# Patient Record
Sex: FEMALE | Race: WHITE | URBAN - METROPOLITAN AREA
[De-identification: names, ages, dates, MRNs, and addresses within clinical notes are randomized per-mention and may not be internally consistent; named-entity substitution may affect disease eponyms.]

---

## 2017-09-11 ENCOUNTER — PRENATAL OFFICE VISIT - HEALTHEAST (OUTPATIENT)
Dept: MIDWIFE SERVICES | Facility: CLINIC | Age: 37
End: 2017-09-11

## 2017-09-11 DIAGNOSIS — O34.599 UTERINE ABNORMALITY DURING PREGNANCY, ANTEPARTUM: ICD-10-CM

## 2017-09-11 DIAGNOSIS — O09.521 SUPERVISION OF HIGH-RISK PREGNANCY OF ELDERLY MULTIGRAVIDA, FIRST TRIMESTER: ICD-10-CM

## 2017-09-11 LAB — HIV 1+2 AB+HIV1 P24 AG SERPL QL IA: NEGATIVE

## 2017-09-11 RX ORDER — LORATADINE 10 MG/1
10 TABLET ORAL DAILY
Status: SHIPPED | COMMUNITY
Start: 2017-09-11

## 2017-09-11 ASSESSMENT — MIFFLIN-ST. JEOR: SCORE: 1347.98

## 2017-09-12 ENCOUNTER — HOSPITAL ENCOUNTER (OUTPATIENT)
Dept: ULTRASOUND IMAGING | Facility: CLINIC | Age: 37
Discharge: HOME | End: 2017-09-12
Attending: MIDWIFE

## 2017-09-12 ENCOUNTER — COMMUNICATION - HEALTHEAST (OUTPATIENT)
Dept: TELEHEALTH | Facility: CLINIC | Age: 37
End: 2017-09-12

## 2017-09-12 ENCOUNTER — COMMUNICATION - HEALTHEAST (OUTPATIENT)
Dept: MIDWIFE SERVICES | Facility: CLINIC | Age: 37
End: 2017-09-12

## 2017-09-12 ENCOUNTER — AMBULATORY - HEALTHEAST (OUTPATIENT)
Dept: MIDWIFE SERVICES | Facility: CLINIC | Age: 37
End: 2017-09-12

## 2017-09-12 DIAGNOSIS — O09.521 SUPERVISION OF HIGH-RISK PREGNANCY OF ELDERLY MULTIGRAVIDA, FIRST TRIMESTER: ICD-10-CM

## 2017-09-12 DIAGNOSIS — O09.522 SUPERVISION OF HIGH-RISK PREGNANCY OF ELDERLY MULTIGRAVIDA, SECOND TRIMESTER: ICD-10-CM

## 2017-09-12 DIAGNOSIS — O34.599 UTERINE ABNORMALITY DURING PREGNANCY, ANTEPARTUM: ICD-10-CM

## 2017-09-12 LAB
HBV SURFACE AG SERPL QL IA: NEGATIVE
SYPHILIS RPR SCREEN - HISTORICAL: NORMAL

## 2017-09-13 ENCOUNTER — COMMUNICATION - HEALTHEAST (OUTPATIENT)
Dept: MIDWIFE SERVICES | Facility: CLINIC | Age: 37
End: 2017-09-13

## 2017-09-14 ENCOUNTER — COMMUNICATION - HEALTHEAST (OUTPATIENT)
Dept: ADMINISTRATIVE | Facility: CLINIC | Age: 37
End: 2017-09-14

## 2017-09-14 LAB
HPV INTERPRETATION - HISTORICAL: NORMAL
HPV INTERPRETER - HISTORICAL: NORMAL

## 2017-09-20 ENCOUNTER — AMBULATORY - HEALTHEAST (OUTPATIENT)
Dept: MIDWIFE SERVICES | Facility: CLINIC | Age: 37
End: 2017-09-20

## 2017-09-20 DIAGNOSIS — O09.529 SUPERVISION OF HIGH-RISK PREGNANCY OF ELDERLY MULTIGRAVIDA: ICD-10-CM

## 2017-09-20 LAB

## 2017-10-05 ENCOUNTER — RECORDS - HEALTHEAST (OUTPATIENT)
Dept: ADMINISTRATIVE | Facility: OTHER | Age: 37
End: 2017-10-05

## 2017-12-13 ENCOUNTER — RECORDS - HEALTHEAST (OUTPATIENT)
Dept: LAB | Facility: CLINIC | Age: 37
End: 2017-12-13

## 2017-12-13 LAB
ALT SERPL W P-5'-P-CCNC: 14 U/L (ref 0–45)
AST SERPL W P-5'-P-CCNC: 17 U/L (ref 0–40)
CREAT SERPL-MCNC: 0.51 MG/DL (ref 0.6–1.1)
GFR SERPL CREATININE-BSD FRML MDRD: >60 ML/MIN/1.73M2

## 2018-01-02 ENCOUNTER — ANESTHESIA - HEALTHEAST (OUTPATIENT)
Dept: SURGERY | Facility: CLINIC | Age: 38
End: 2018-01-02

## 2018-01-03 ENCOUNTER — SURGERY - HEALTHEAST (OUTPATIENT)
Dept: SURGERY | Facility: CLINIC | Age: 38
End: 2018-01-03

## 2018-01-03 ENCOUNTER — ANESTHESIA - HEALTHEAST (OUTPATIENT)
Dept: SURGERY | Facility: CLINIC | Age: 38
End: 2018-01-03

## 2018-01-03 ASSESSMENT — MIFFLIN-ST. JEOR
SCORE: 1444.36
SCORE: 1444.36

## 2018-01-06 ENCOUNTER — HOME CARE/HOSPICE - HEALTHEAST (OUTPATIENT)
Dept: HOME HEALTH SERVICES | Facility: HOME HEALTH | Age: 38
End: 2018-01-06

## 2018-01-09 ENCOUNTER — COMMUNICATION - HEALTHEAST (OUTPATIENT)
Dept: OBGYN | Facility: CLINIC | Age: 38
End: 2018-01-09

## 2018-03-08 ENCOUNTER — ANESTHESIA - HEALTHEAST (OUTPATIENT)
Dept: SURGERY | Facility: CLINIC | Age: 38
End: 2018-03-08

## 2018-03-08 ENCOUNTER — SURGERY - HEALTHEAST (OUTPATIENT)
Dept: SURGERY | Facility: CLINIC | Age: 38
End: 2018-03-08

## 2018-03-08 ASSESSMENT — MIFFLIN-ST. JEOR: SCORE: 1326.32

## 2018-03-09 ASSESSMENT — MIFFLIN-ST. JEOR: SCORE: 1335.39

## 2018-03-20 ENCOUNTER — OFFICE VISIT - HEALTHEAST (OUTPATIENT)
Dept: FAMILY MEDICINE | Facility: CLINIC | Age: 38
End: 2018-03-20

## 2018-03-20 DIAGNOSIS — E87.6 LOW BLOOD POTASSIUM: ICD-10-CM

## 2018-03-20 DIAGNOSIS — N13.2 URETERAL STONE WITH HYDRONEPHROSIS: ICD-10-CM

## 2018-03-20 DIAGNOSIS — Z01.810 PREOP CARDIOVASCULAR EXAM: ICD-10-CM

## 2018-03-20 LAB
ANION GAP SERPL CALCULATED.3IONS-SCNC: 12 MMOL/L (ref 5–18)
BUN SERPL-MCNC: 19 MG/DL (ref 8–22)
CALCIUM SERPL-MCNC: 9.4 MG/DL (ref 8.5–10.5)
CHLORIDE BLD-SCNC: 105 MMOL/L (ref 98–107)
CO2 SERPL-SCNC: 23 MMOL/L (ref 22–31)
CREAT SERPL-MCNC: 0.68 MG/DL (ref 0.6–1.1)
ERYTHROCYTE [DISTWIDTH] IN BLOOD BY AUTOMATED COUNT: 12.8 % (ref 11–14.5)
GFR SERPL CREATININE-BSD FRML MDRD: >60 ML/MIN/1.73M2
GLUCOSE BLD-MCNC: 97 MG/DL (ref 70–125)
HCT VFR BLD AUTO: 35.5 % (ref 35–47)
HGB BLD-MCNC: 11.9 G/DL (ref 12–16)
MCH RBC QN AUTO: 29.7 PG (ref 27–34)
MCHC RBC AUTO-ENTMCNC: 33.4 G/DL (ref 32–36)
MCV RBC AUTO: 89 FL (ref 80–100)
PLATELET # BLD AUTO: 555 THOU/UL (ref 140–440)
PMV BLD AUTO: 7.2 FL (ref 7–10)
POTASSIUM BLD-SCNC: 4.2 MMOL/L (ref 3.5–5)
RBC # BLD AUTO: 3.99 MILL/UL (ref 3.8–5.4)
SODIUM SERPL-SCNC: 140 MMOL/L (ref 136–145)
WBC: 6.9 THOU/UL (ref 4–11)

## 2018-03-20 ASSESSMENT — MIFFLIN-ST. JEOR: SCORE: 1350.81

## 2018-03-22 ENCOUNTER — ANESTHESIA - HEALTHEAST (OUTPATIENT)
Dept: SURGERY | Facility: CLINIC | Age: 38
End: 2018-03-22

## 2018-03-22 ENCOUNTER — COMMUNICATION - HEALTHEAST (OUTPATIENT)
Dept: FAMILY MEDICINE | Facility: CLINIC | Age: 38
End: 2018-03-22

## 2018-03-23 ENCOUNTER — SURGERY - HEALTHEAST (OUTPATIENT)
Dept: SURGERY | Facility: CLINIC | Age: 38
End: 2018-03-23

## 2018-03-23 ENCOUNTER — COMMUNICATION - HEALTHEAST (OUTPATIENT)
Dept: UROLOGY | Facility: CLINIC | Age: 38
End: 2018-03-23

## 2018-03-23 DIAGNOSIS — N13.2 URETERAL STONE WITH HYDRONEPHROSIS: ICD-10-CM

## 2018-03-23 RX ORDER — TOLTERODINE TARTRATE 2 MG/1
2 TABLET, EXTENDED RELEASE ORAL 2 TIMES DAILY
Qty: 28 TABLET | Refills: 0 | Status: SHIPPED | OUTPATIENT
Start: 2018-03-23 | End: 2018-04-06

## 2018-03-23 ASSESSMENT — MIFFLIN-ST. JEOR: SCORE: 1335.84

## 2018-03-27 ENCOUNTER — COMMUNICATION - HEALTHEAST (OUTPATIENT)
Dept: UROLOGY | Facility: CLINIC | Age: 38
End: 2018-03-27

## 2018-03-29 ENCOUNTER — AMBULATORY - HEALTHEAST (OUTPATIENT)
Dept: UROLOGY | Facility: CLINIC | Age: 38
End: 2018-03-29

## 2018-03-29 DIAGNOSIS — N20.1 CALCULUS OF URETER: ICD-10-CM

## 2018-03-29 DIAGNOSIS — N20.0 CALCULUS OF KIDNEY: ICD-10-CM

## 2018-03-29 DIAGNOSIS — N20.9 URINARY TRACT STONES: ICD-10-CM

## 2018-03-29 LAB
ALBUMIN UR-MCNC: NEGATIVE MG/DL
APPEARANCE UR: CLEAR
BILIRUB UR QL STRIP: NEGATIVE
COLOR UR AUTO: YELLOW
GLUCOSE UR STRIP-MCNC: NEGATIVE MG/DL
HGB UR QL STRIP: ABNORMAL
KETONES UR STRIP-MCNC: NEGATIVE MG/DL
LEUKOCYTE ESTERASE UR QL STRIP: ABNORMAL
NITRATE UR QL: NEGATIVE
PH UR STRIP: 5.5 [PH] (ref 5–8)
SP GR UR STRIP: 1.01 (ref 1–1.03)
UROBILINOGEN UR STRIP-ACNC: ABNORMAL

## 2018-03-30 LAB — BACTERIA SPEC CULT: NO GROWTH

## 2018-05-07 ENCOUNTER — AMBULATORY - HEALTHEAST (OUTPATIENT)
Dept: LAB | Facility: CLINIC | Age: 38
End: 2018-05-07

## 2018-05-07 ENCOUNTER — OFFICE VISIT - HEALTHEAST (OUTPATIENT)
Dept: UROLOGY | Facility: CLINIC | Age: 38
End: 2018-05-07

## 2018-05-07 DIAGNOSIS — N20.0 CALCULUS OF KIDNEY: ICD-10-CM

## 2018-05-07 DIAGNOSIS — N20.9 URINARY TRACT STONES: ICD-10-CM

## 2018-05-07 DIAGNOSIS — N20.9 URINARY CALCULUS: ICD-10-CM

## 2018-05-07 LAB
ALBUMIN SERPL-MCNC: 3.9 G/DL (ref 3.5–5)
ALBUMIN UR-MCNC: NEGATIVE MG/DL
ANION GAP SERPL CALCULATED.3IONS-SCNC: 11 MMOL/L (ref 5–18)
APPEARANCE UR: CLEAR
BILIRUB UR QL STRIP: NEGATIVE
BUN SERPL-MCNC: 10 MG/DL (ref 8–22)
CALCIUM SERPL-MCNC: 9.1 MG/DL (ref 8.5–10.5)
CALCIUM SERPL-MCNC: 9.1 MG/DL (ref 8.5–10.5)
CALCIUM, IONIZED MEASURED: 1.17 MMOL/L (ref 1.11–1.3)
CHLORIDE BLD-SCNC: 106 MMOL/L (ref 98–107)
CO2 SERPL-SCNC: 23 MMOL/L (ref 22–31)
COLOR UR AUTO: YELLOW
CREAT SERPL-MCNC: 0.66 MG/DL (ref 0.6–1.1)
CREAT SERPL-MCNC: 0.68 MG/DL (ref 0.6–1.1)
GFR SERPL CREATININE-BSD FRML MDRD: >60 ML/MIN/1.73M2
GFR SERPL CREATININE-BSD FRML MDRD: >60 ML/MIN/1.73M2
GLUCOSE BLD-MCNC: 96 MG/DL (ref 70–125)
GLUCOSE UR STRIP-MCNC: NEGATIVE MG/DL
HGB UR QL STRIP: NEGATIVE
ION CA PH 7.4: 1.13 MMOL/L (ref 1.11–1.3)
KETONES UR STRIP-MCNC: NEGATIVE MG/DL
LEUKOCYTE ESTERASE UR QL STRIP: ABNORMAL
MAGNESIUM SERPL-MCNC: 2 MG/DL (ref 1.8–2.6)
NITRATE UR QL: NEGATIVE
PH UR STRIP: 6 [PH] (ref 5–8)
PH: 7.32 (ref 7.35–7.45)
PHOSPHATE SERPL-MCNC: 2.9 MG/DL (ref 2.5–4.5)
PHOSPHATE SERPL-MCNC: 2.9 MG/DL (ref 2.5–4.5)
POTASSIUM BLD-SCNC: 4.2 MMOL/L (ref 3.5–5)
PTH-INTACT SERPL-MCNC: 60 PG/ML (ref 10–86)
SODIUM SERPL-SCNC: 140 MMOL/L (ref 136–145)
SP GR UR STRIP: 1.02 (ref 1–1.03)
URATE SERPL-MCNC: 3.1 MG/DL (ref 2–7.5)
UROBILINOGEN UR STRIP-ACNC: ABNORMAL

## 2020-08-18 ENCOUNTER — COMMUNICATION - HEALTHEAST (OUTPATIENT)
Dept: UROLOGY | Facility: CLINIC | Age: 40
End: 2020-08-18

## 2020-08-21 ENCOUNTER — COMMUNICATION - HEALTHEAST (OUTPATIENT)
Dept: UROLOGY | Facility: CLINIC | Age: 40
End: 2020-08-21

## 2020-08-28 ENCOUNTER — COMMUNICATION - HEALTHEAST (OUTPATIENT)
Dept: UROLOGY | Facility: CLINIC | Age: 40
End: 2020-08-28

## 2020-09-04 ENCOUNTER — COMMUNICATION - HEALTHEAST (OUTPATIENT)
Dept: UROLOGY | Facility: CLINIC | Age: 40
End: 2020-09-04

## 2020-09-11 ENCOUNTER — COMMUNICATION - HEALTHEAST (OUTPATIENT)
Dept: UROLOGY | Facility: CLINIC | Age: 40
End: 2020-09-11

## 2021-05-31 VITALS
HEIGHT: 62 IN | WEIGHT: 183 LBS | WEIGHT: 183 LBS | BODY MASS INDEX: 33.68 KG/M2 | HEIGHT: 62 IN | BODY MASS INDEX: 33.68 KG/M2

## 2021-05-31 VITALS — WEIGHT: 160 LBS | HEIGHT: 62 IN | BODY MASS INDEX: 29.44 KG/M2

## 2021-06-01 VITALS — BODY MASS INDEX: 29.72 KG/M2 | WEIGHT: 161.5 LBS | HEIGHT: 62 IN

## 2021-06-01 VITALS — HEIGHT: 60 IN | WEIGHT: 164.4 LBS | BODY MASS INDEX: 32.28 KG/M2

## 2021-06-01 VITALS — HEIGHT: 62 IN | WEIGHT: 158.2 LBS | BODY MASS INDEX: 29.11 KG/M2

## 2021-06-10 NOTE — TELEPHONE ENCOUNTER
Message left for patient to call clinic to set up an appointment for kidney stone follow-up for today or tomorrow.  Fiona Rider RN

## 2021-06-10 NOTE — TELEPHONE ENCOUNTER
Message left for patient to call clinic to set up an appointment for kidney stone follow-up.  Fiona Rider RN

## 2021-06-11 NOTE — TELEPHONE ENCOUNTER
Spoke with patient who states that she does not have insurance at this time and is feeling well.  She will call [facility] if needed and understands her diagnosis and risks.  Fiona Rider RN

## 2021-06-12 NOTE — PROGRESS NOTES
Ultrasound report reviewed with patient over the phone.  Patient very surprised as she thought she was only 11 weeks gestation.  Agreeable to change EDC to reflect current dating of 21 weeks and 1 day.  Reviewed normal fetal survey.  No questions at this time.  Encouraged to follow-up for routine prenatal care in 4 weeks.

## 2021-06-12 NOTE — PROGRESS NOTES
PRENATAL VISIT   FIRST OBSTETRICAL EXAM - OB    Assessment / Impression     First prenatal visit at 11w0d    High risk pregnancy of elderly multiparous woman with uterine septum, 3 repeat C/S and 6 D&C after SABs (6) in first trimester    Plan:     -Transfer care to obstetrician if viable pregnancy  -IOB labs drawn  -Danger s/sx for this trimester reviewed with patient.  -The following referrals were given to patient for follow up: first trimester ultrasound to confirm viability, referral to OB/GYN physician for care.  -IOB packet given and reviewed with patient.      Total time spent with patient: 45 minutes, >50% time spent counseling and coordinating care.    Subjective:    Sweta Melchor is a 37 y.o.  here today for her First Obstetrical Exam.  Unplanned pregnancy, was using condoms for birth control but not consistently. Here today with Diego. After discussion pt agrees to transfer care to obstetrician for care if viable pregnancy. Given numbers for OB groups in the [facility].     Education level: some college  Occupation: homemaker  OB History    Para Term  AB Living   4 3 3   3   SAB TAB Ectopic Multiple Live Births       3      # Outcome Date GA Lbr Smith/2nd Weight Sex Delivery Anes PTL Lv   4 Current            3 Term 12 38w0d  8 lb 7 oz (3.827 kg) M CS-LTranv   SUJATHA   2 Term 05 38w0d  7 lb 8 oz (3.402 kg) M CS-LTranv   SUJATHA   1 Term 10/15/97 38w0d  6 lb 4 oz (2.835 kg) F CS-LTranv   SUJATHA          Expected Date of Delivery: 2018, Date entered prior to episode creation    No past medical history on file.  No past surgical history on file.  Social History   Substance Use Topics     Smoking status: Never Smoker     Smokeless tobacco: Never Used     Alcohol use No     Current Outpatient Prescriptions   Medication Sig Dispense Refill     DIPHENHYDRAMINE HCL (BENADRYL ALLERGY ORAL) Take by mouth.       loratadine (CLARITIN) 10 mg tablet Take 10 mg by mouth daily.    "    PRENATAL VIT CALC,IRON,FOLIC (PRENATAL VITAMIN ORAL) Take by mouth.       No current facility-administered medications for this visit.      Allergies   Allergen Reactions     Codeine      Other Environmental Allergy      Penicillins              Pregnancy Risk Factors: Age is <18 or >35, 1st trimester pregnancy loss, 3 or more, Significantly overweight or underweight and Uterine abnormality    EPDS score today: 3    Review of Systems  General:  Denies problem  Eyes: Denies problem  Ears/Nose/Throat: Denies problem  Cardiovascular: Denies problem  Respiratory:  Denies problem  Gastrointestinal: some nausea  Genitourinary: Denies problem  Musculoskeletal: fatigue  Skin: Denies problem  Neurologic: Denies problem  Psychiatric: Denies problem  Endocrine: Denies problem  Heme/Lymphatic: breast tenderness  Allergic/Immunologic: Denies problem       Objective:   Objective    Vitals:    09/11/17 1049   BP: 124/72   Pulse: 84   Weight: 160 lb (72.6 kg)   Height: 5' 2.25\" (1.581 m)     Physical Exam:  General Appearance: Alert, cooperative, no distress, appears stated age  Head: Normocephalic, without obvious abnormality, atraumatic  Eyes: Conjunctiva/corneas clear, does not wear corrective lenses  Neck: Supple, symmetrical, trachea midline, no adenopathy  Thyroid: not enlarged, symmetric, no tenderness/mass/nodules  Back: Symmetric, no curvature, ROM normal, no CVA tenderness  Lungs: Clear to auscultation bilaterally, respirations unlabored  Heart: Regular rate and rhythm, S1 and S2 normal, no murmur, rub, or gallop  Breasts: No breast masses, tenderness, asymmetry, or nipple discharge. Nipples are  everted.  Abdomen: Soft, non-tender, bowel sounds active all four quadrants, no masses.   FHT: not heard with Doptone today   Vulva:  no sign of lesions or condyloma, normal hair distribution  Vagina: pink, normal rugae, no abnormal discharge  Cervix:  long/thick/closed, no lesions or inflammation noted, negative CMT with " exam  Uterus: mid position, non tender, enlarged approximately 10 weeks size  Adnexa:  no masses appreciated, non-tender with palpation  Pelvic spines:AVERAGE  Sacrum:CONCAVE  Suprapubic Arch:NORMAL  Pelvis type:gynecoid            Extremities: Extremities normal, atraumatic, no cyanosis or edema  Skin: Skin color, texture, turgor normal, no rashes or lesions  Lymph nodes: Cervical, supraclavicular, and axillary nodes normal  Neurologic: Alert and oriented x 3.    Lab: No results found for this or any previous visit.

## 2021-06-15 NOTE — ANESTHESIA PREPROCEDURE EVALUATION
Anesthesia Evaluation      Patient summary reviewed   No history of anesthetic complications     Airway   Mallampati: I  Neck ROM: full   Pulmonary - negative ROS and normal exam    breath sounds clear to auscultation                         Cardiovascular - negative ROS and normal exam  Exercise tolerance: > or = 4 METS  Rhythm: regular  Rate: normal,         Neuro/Psych - negative ROS     Endo/Other    (+) obesity, pregnant     GI/Hepatic/Renal    (+)   chronic renal disease,      Other findings: Nauseated, significant bleeding after  in need of emergent D+C.      Dental - normal exam                        Anesthesia Plan  Planned anesthetic: general endotracheal    ASA 2 - emergent     Anesthetic plan and risks discussed with: patient  Anesthesia plan special considerations: rapid sequence induction,   Post-op plan: routine recovery

## 2021-06-15 NOTE — ANESTHESIA PREPROCEDURE EVALUATION
Anesthesia Evaluation      Patient summary reviewed   No history of anesthetic complications     Airway   Mallampati: I  Neck ROM: full   Pulmonary - normal exam    breath sounds clear to auscultation  (+) a smoker                         Cardiovascular - negative ROS and normal exam  Exercise tolerance: > or = 4 METS  Rhythm: regular  Rate: normal,         Neuro/Psych - negative ROS     Endo/Other    (+) obesity, pregnant     GI/Hepatic/Renal - negative ROS   (+)   chronic renal disease,      Other findings: Gravid. Denies PIH, GDM or Preeclampsia.          Dental                         Anesthesia Plan  Planned anesthetic: spinal    ASA 2     Anesthetic plan and risks discussed with: patient    Post-op plan: routine recovery

## 2021-06-15 NOTE — ANESTHESIA POSTPROCEDURE EVALUATION
Patient: Sweta Melchor   SECTION, REPEAT, WITH TUBAL LIGATION  Anesthesia type: spinal    Patient location: PACU  Last vitals:   Vitals:    18 1200   BP: 119/69   Pulse: 76   Resp: 11   Temp: 36.4  C (97.5  F)   SpO2: 97%     Post vital signs: stable  Level of consciousness: awake and responds to simple questions  Post-anesthesia pain: pain controlled  Post-anesthesia nausea and vomiting: no  Pulmonary: unassisted, return to baseline  Cardiovascular: stable and blood pressure at baseline  Hydration: adequate  Anesthetic events: no    QCDR Measures:  ASA# 11 - Maggi-op Cardiac Arrest: ASA11B - Patient did NOT experience unanticipated cardiac arrest  ASA# 12 - Maggi-op Mortality Rate: ASA12B - Patient did NOT die  ASA# 13 - PACU Re-Intubation Rate: ASA13B - Patient did NOT require a new airway mgmt  ASA# 10 - Composite Anes Safety: ASA10A - No serious adverse event    Additional Notes:

## 2021-06-15 NOTE — ANESTHESIA CARE TRANSFER NOTE
Last vitals:   Vitals:    01/03/18 0911   BP: 130/63   Pulse: 98   Resp: 16   Temp: 36.8  C (98.2  F)   SpO2: 99%     Patient's level of consciousness is awake  Spontaneous respirations: yes  Maintains airway independently: yes  Dentition unchanged: yes  Oropharynx: oropharynx clear of all foreign objects    QCDR Measures:  ASA# 20 - Surgical Safety Checklist: WHO surgical safety checklist completed prior to induction  PQRS# 430 - Adult PONV Prevention: 4558F - Pt received => 2 anti-emetic agents (different classes) preop & intraop  ASA# 8 - Peds PONV Prevention: NA - Not pediatric patient, not GA or 2 or more risk factors NOT present  PQRS# 424 - Maggi-op Temp Management: 4559F - At least one body temp DOCUMENTED => 35.5C or 95.9F within required timeframe  PQRS# 426 - PACU Transfer Protocol: - Transfer of care checklist used  ASA# 14 - Acute Post-op Pain: ASA14B - Patient did NOT experience pain >= 7 out of 10

## 2021-06-15 NOTE — ANESTHESIA PROCEDURE NOTES
Spinal Block    Patient location during procedure: OR  Start time: 1/3/2018 7:46 AM  End time: 1/3/2018 7:50 AM  Reason for block: primary anesthetic    Staffing:  Performing  Anesthesiologist: BETTY PENA    Preanesthetic Checklist  Completed: patient identified, risks, benefits, and alternatives discussed, timeout performed, consent obtained, airway assessed, oxygen available, suction available, emergency drugs available and hand hygiene performed  Spinal Block  Patient position: sitting  Prep: ChloraPrep and site prepped and draped  Patient monitoring: heart rate, cardiac monitor, continuous pulse ox and blood pressure  Approach: midline  Location: L4-5  Injection technique: single-shot  Needle type: pencil-tip   Needle gauge: 24 G

## 2021-06-15 NOTE — ANESTHESIA POSTPROCEDURE EVALUATION
Patient: Sweta Melchor  DILATION AND CURETTAGE  Anesthesia type: No value filed.    Patient location: PACU  Last vitals:   Vitals:    01/03/18 1150   BP: 118/71   Pulse: 87   Resp: 12   Temp:    SpO2: 96%     Post vital signs: stable  Level of consciousness: awake and responds to simple questions  Post-anesthesia pain: pain controlled  Post-anesthesia nausea and vomiting: no  Pulmonary: unassisted, return to baseline  Cardiovascular: stable and blood pressure at baseline  Hydration: adequate  Anesthetic events: no    QCDR Measures:  ASA# 11 - Maggi-op Cardiac Arrest: ASA11B - Patient did NOT experience unanticipated cardiac arrest  ASA# 12 - Maggi-op Mortality Rate: ASA12B - Patient did NOT die  ASA# 13 - PACU Re-Intubation Rate: ASA13B - Patient did NOT require a new airway mgmt  ASA# 10 - Composite Anes Safety: ASA10A - No serious adverse event    Additional Notes:

## 2021-06-15 NOTE — ANESTHESIA CARE TRANSFER NOTE
Last vitals:   Vitals:    01/03/18 1120   BP: 126/66   Pulse: 89   Resp: 16   Temp: 36.7  C (98  F)   SpO2: 100%     Patient's level of consciousness is awake and drowsy  Spontaneous respirations: yes  Maintains airway independently: yes  Dentition unchanged: yes  Oropharynx: oropharynx clear of all foreign objects    QCDR Measures:  ASA# 20 - Surgical Safety Checklist: WHO surgical safety checklist completed prior to induction  PQRS# 430 - Adult PONV Prevention: 4558F - Pt received => 2 anti-emetic agents (different classes) preop & intraop  ASA# 8 - Peds PONV Prevention: NA - Not pediatric patient, not GA or 2 or more risk factors NOT present  PQRS# 424 - Maggi-op Temp Management: 4559F - At least one body temp DOCUMENTED => 35.5C or 95.9F within required timeframe  PQRS# 426 - PACU Transfer Protocol: - Transfer of care checklist used  ASA# 14 - Acute Post-op Pain: ASA14A - Patient experienced pain >= 7 out of 10

## 2021-06-16 PROBLEM — O09.529 SUPERVISION OF HIGH-RISK PREGNANCY OF ELDERLY MULTIGRAVIDA: Status: ACTIVE | Noted: 2017-09-11

## 2021-06-16 PROBLEM — O34.599 UTERINE ABNORMALITY DURING PREGNANCY, ANTEPARTUM: Status: ACTIVE | Noted: 2017-09-11

## 2021-06-16 PROBLEM — N13.2 HYDRONEPHROSIS WITH URINARY OBSTRUCTION DUE TO URETERAL CALCULUS: Status: ACTIVE | Noted: 2018-03-08

## 2021-06-16 PROBLEM — N13.2 URETERAL STONE WITH HYDRONEPHROSIS: Status: ACTIVE | Noted: 2018-03-08

## 2021-06-16 PROBLEM — N20.1 CALCULUS OF URETER: Status: ACTIVE | Noted: 2018-03-08

## 2021-06-16 PROBLEM — O99.011 ANTEPARTUM ANEMIA IN FIRST TRIMESTER: Status: ACTIVE | Noted: 2017-09-12

## 2021-06-16 PROBLEM — N39.0 UTI (URINARY TRACT INFECTION): Status: ACTIVE | Noted: 2018-03-08

## 2021-06-16 PROBLEM — N12 PYELONEPHRITIS: Status: ACTIVE | Noted: 2018-03-07

## 2021-06-16 PROBLEM — A41.9 SEPSIS (H): Status: ACTIVE | Noted: 2018-03-08

## 2021-06-16 PROBLEM — Z98.891 HISTORY OF CESAREAN DELIVERY: Status: ACTIVE | Noted: 2018-01-03

## 2021-06-16 NOTE — ANESTHESIA CARE TRANSFER NOTE
Last vitals:   Vitals:    03/23/18 0835   BP: 141/81   Pulse: 66   Resp: 16   Temp: 36.6  C (97.9  F)   SpO2: 100%     Patient's level of consciousness is drowsy  Spontaneous respirations: yes  Maintains airway independently: yes  Dentition unchanged: yes  Oropharynx: oropharynx clear of all foreign objects    QCDR Measures:  ASA# 20 - Surgical Safety Checklist: WHO surgical safety checklist completed prior to induction  PQRS# 430 - Adult PONV Prevention: 4558F - Pt received => 2 anti-emetic agents (different classes) preop & intraop  ASA# 8 - Peds PONV Prevention: NA - Not pediatric patient, not GA or 2 or more risk factors NOT present  PQRS# 424 - Maggi-op Temp Management: 4559F - At least one body temp DOCUMENTED => 35.5C or 95.9F within required timeframe  PQRS# 426 - PACU Transfer Protocol: - Transfer of care checklist used  ASA# 14 - Acute Post-op Pain: ASA14B - Patient did NOT experience pain >= 7 out of 10

## 2021-06-16 NOTE — ANESTHESIA POSTPROCEDURE EVALUATION
Patient: Sweta Melchor  CYSTOSCOPY, WITH URETERAL STENT INSERTION  Anesthesia type: MAC    Patient location: PACU  Last vitals:   Vitals:    03/08/18 1251   BP: 102/63   Pulse: (!) 110   Resp: 14   Temp: 36.1  C (96.9  F)   SpO2: 100%     Post vital signs: stable  Level of consciousness: awake and responds to simple questions  Post-anesthesia pain: pain controlled  Post-anesthesia nausea and vomiting: no  Pulmonary: unassisted, face mask  Cardiovascular: stable and blood pressure at baseline  Hydration: adequate  Anesthetic events: no    QCDR Measures:  ASA# 11 - Maggi-op Cardiac Arrest: ASA11B - Patient did NOT experience unanticipated cardiac arrest  ASA# 12 - Maggi-op Mortality Rate: ASA12B - Patient did NOT die  ASA# 13 - PACU Re-Intubation Rate: NA - No ETT / LMA used for case  ASA# 10 - Composite Anes Safety: ASA10A - No serious adverse event    Additional Notes:

## 2021-06-16 NOTE — PROGRESS NOTES
Preoperative Exam    Scheduled Procedure: kidney stone removal   Surgery Date:  03/23/2018  Surgery Location: [facility], fax [phone]    Surgeon:  Dr. Lupillo Santana     Assessment/Plan:     Sweta was seen today for pre-op exam and establish care.    Diagnoses and all orders for this visit:    Preop cardiovascular exam    Ureteral stone with hydronephrosis  -     HM2(CBC w/o Differential)    Low blood potassium  -     Basic Metabolic Panel      Surgical Procedure Risk: Low (reported cardiac risk generally < 1%)  Have you had prior anesthesia?: Yes  Have you or any family members had a previous anesthesia reaction:  No  Do you or any family members have a history of a clotting or bleeding disorder?: No, but pt did hemmorrhage during 2 DNCs and after last child birth  Cardiac Risk Assessment: no increased risk for major cardiac complications    Patient approved for surgery with general or local anesthesia.        Functional Status: Independent  Patient plans to recover at home with family.     Subjective:      Sweta Melchor is a 38 y.o. female who presents for a preoperative consultation. CYSTOSCOPY, RIGHT URETEROSCOPY, LASER LITHOTRIPSY, STENT REMOVAL, STENT INSERTION  All other systems reviewed and are negative, other than those listed in the HPI.    Pertinent History  Do you have difficulty breathing or chest pain after walking up a flight of stairs: No  History of obstructive sleep apnea: No  Steroid use in the last 6 months: No  Frequent Aspirin/NSAID use: No  Prior Blood Transfusion: Yes: after last child birth  Prior Blood Transfusion Reaction: No  If for some reason prior to, during or after the procedure, if it is medically indicated, would you be willing to have a blood transfusion?:  There is no transfusion refusal.    Current Outpatient Prescriptions   Medication Sig Dispense Refill     cholecalciferol, vitamin D3, 1,000 unit tablet Take 1,000 Units by mouth daily.       ciprofloxacin HCl  (CIPRO) 500 MG tablet Take 1 tablet (500 mg total) by mouth 2 times a day at 6:00 am and 4:00 pm for 14 days. 28 tablet 0     DIPHENHYDRAMINE HCL (BENADRYL ALLERGY ORAL) Take 25 mg by mouth every 6 (six) hours as needed.        HYDROmorphone (DILAUDID) 2 MG tablet Take 1-2 tablets (2-4 mg total) by mouth every 4 (four) hours as needed for pain. 30 tablet 0     loratadine (CLARITIN) 10 mg tablet Take 10 mg by mouth daily.       PRENATAL VIT CALC,IRON,FOLIC (PRENATAL VITAMIN ORAL) Take 1 tablet by mouth daily.        tamsulosin (FLOMAX) 0.4 mg Cp24 Take 1 capsule (0.4 mg total) by mouth daily after supper for 14 days. 14 capsule 0     tolterodine (DETROL) 2 MG tablet Take 1 tablet (2 mg total) by mouth 2 (two) times a day for 14 days. 28 tablet 0     No current facility-administered medications for this visit.         Allergies   Allergen Reactions     Codeine Nausea And Vomiting and Swelling     Hydrocodone-Acetaminophen Nausea And Vomiting     Other Environmental Allergy      Oxycodone-Acetaminophen Angiodema and Nausea Only     Penicillins Nausea And Vomiting and Other (See Comments)     Migraine     Sumatriptan Succinate Nausea And Vomiting       Patient Active Problem List   Diagnosis     Supervision of high-risk pregnancy of elderly multigravida     Uterine abnormality during pregnancy, antepartum     Antepartum anemia in first trimester     History of  delivery      delivery delivered     Pyelonephritis     Ureteral stone with hydronephrosis     Sepsis     Calculus of ureter     Hydronephrosis with urinary obstruction due to ureteral calculus     UTI (urinary tract infection)     Septate uterus       Past Medical History:   Diagnosis Date     Allergic      Kidney infection     infrequent now     Uterine abnormality during pregnancy, antepartum     septum in uterus       Past Surgical History:   Procedure Laterality Date     CERVICAL BIOPSY  W/ LOOP ELECTRODE EXCISION        SECTION,  "LOW TRANSVERSE  10/1997, 4/2005, 4/2012     DILATION AND CURETTAGE OF UTERUS  1999,2002,2003, 2013,2014,2015    had hemorrhage after D&C in 2013     DILATION AND CURETTAGE OF UTERUS N/A 1/3/2018    Procedure: DILATION AND CURETTAGE;  Surgeon: Piper Zheng MD;  Location: [facility] OR;  Service:      SD CYSTOSCOPY,INSERT URETERAL STENT Right 3/8/2018    Procedure: CYSTOSCOPY, WITH URETERAL STENT INSERTION;  Surgeon: Lupillo Santana MD;  Location: [facility] OR;  Service: Urology       Social History     Social History     Marital status:      Spouse name: N/A     Number of children: N/A     Years of education: N/A     Occupational History     Not on file.     Social History Main Topics     Smoking status: Current Every Day Smoker     Types: Cigarettes     Last attempt to quit: 2017     Smokeless tobacco: Never Used      Comment: 3-4 cigs per day      Alcohol use No     Drug use: No     Sexual activity: Yes     Partners: Male     Birth control/ protection: None     Other Topics Concern     Not on file     Social History Narrative             Objective:     Vitals:    03/20/18 0957   BP: 116/68   Pulse: 96   Weight: 161 lb 8 oz (73.3 kg)   Height: 5' 2\" (1.575 m)   LMP: 03/20/2018         Physical Exam:  GEN: alert, well appearing  EYES: clear  R EAR: canal clear, TM pearly gray  L EAR: canal clear, TM pearly gray  NOSE: clear  OROPHARYNX: clear  NECK: supple, no significant LAD  CVS: RRR, no murmur  LUNGS: clear, no increased work of breathing  ABD: soft, non-tender, non-distended  EXT: warm, well perfused, no swelling  MSK: nl muscle bulk, spine straight  NEURO: CN grossly intact, nl strength in UE and LE, nl gait,  SKIN: clear    Patient Instructions   Preparing for Surgery  Your white blood cell count improving along with hemoglobin   We still waiting for potassium level    Romi Cartagena MD 3/20/2018 10:13 AM      What you should do:    If you smoke, quit. Smokers may be up to twice as likely " to experience complications after surgery such as pneumonia and respiratory distress.  A week of not smoking before surgery can   help you recover faster    If you have a Health Care Directive, please bring a signed copy to the hospital.    Follow the fasting instructions given to you by your surgeon    Make plans for support when you return home from the hospital (who will help you with household chores and driving while you recover?)    Your medicine plan:     Stop all vitamins, mineral and other supplements 1 week before surgery.    Take all other regular medicines with a little water the morning of your surgery     Do not take any aspirin, AdvilÆ (ibuprofen), or AleveÆ (naproxen) for 7 days before your surgery.  These can increase the risk of bleeding complications during and after your surgery    It is OK to take Tylenol  (acetaminophen) within 7 days of surgery.    Please leave your medicines at home. The hospital will give you any medicines you may need during your hospital stay.    Who should you see if the after hospital plan is not working?    Call your surgeon for any of the following:  o Your incision has any signs of separation  o Signs of infection (increasing redness, swelling, tenderness, warmth, change in appearance, or increased drainage)  o Temperature greater than 101 degrees Fahrenheit  o Drainage from your incision that is green, creamy, sticky, or if it lasts for more than 2 weeks  o Severe pain that is not relieved by medicine, rest or ice    Call your primary care clinic for any of the following:  o If you gain more than three pounds in one day or five pounds in one week  o For trouble breathing more than usual  o Have severe constipation, diarrhea or nausea  o Nausea (upset stomach) and vomiting and/or diarrhea that will not stop  o Blood in your urine or stool    Call 911 if you feel you are having a medical emergency    When should you be seen again?    Your surgeon will let you know when  to be seen for surgical follow-up       If you stay overnight in the hospital, you may also be scheduled to see your primary care provider within 5 days of going home from the hospital to make sure medicines are not causing problems and you understand how to care for yourself.        Romi Cartagena MD 3/20/2018 10:12 AM            Labs:  Recent Results (from the past 24 hour(s))   HM2(CBC w/o Differential)    Collection Time: 03/20/18  9:44 AM   Result Value Ref Range    WBC 6.9 4.0 - 11.0 thou/uL    RBC 3.99 3.80 - 5.40 mill/uL    Hemoglobin 11.9 (L) 12.0 - 16.0 g/dL    Hematocrit 35.5 35.0 - 47.0 %    MCV 89 80 - 100 fL    MCH 29.7 27.0 - 34.0 pg    MCHC 33.4 32.0 - 36.0 g/dL    RDW 12.8 11.0 - 14.5 %    Platelets 555 (H) 140 - 440 thou/uL    MPV 7.2 7.0 - 10.0 fL       Immunization History   Administered Date(s) Administered     Hep B, Adult 09/26/2013     Tdap 03/10/2010, 11/01/2017           Electronically signed by Romi Cartagena MD 03/20/18 9:50 AM

## 2021-06-16 NOTE — ANESTHESIA POSTPROCEDURE EVALUATION
Patient: Sweta Melchor  CYSTOSCOPY, RIGHT URETEROSCOPY, LASER LITHOTRIPSY, STENT REMOVAL, STENT INSERTION  Anesthesia type: general    Patient location: PACU  Last vitals:   Vitals:    03/23/18 0945   BP: 123/70   Pulse: 72   Resp: 18   Temp:    SpO2: 99%     Post vital signs: stable  Level of consciousness: awake and responds to simple questions  Post-anesthesia pain: pain controlled  Post-anesthesia nausea and vomiting: no  Pulmonary: unassisted, return to baseline  Cardiovascular: stable and blood pressure at baseline  Hydration: adequate  Anesthetic events: no    QCDR Measures:  ASA# 11 - Maggi-op Cardiac Arrest: ASA11B - Patient did NOT experience unanticipated cardiac arrest  ASA# 12 - Maggi-op Mortality Rate: ASA12B - Patient did NOT die  ASA# 13 - PACU Re-Intubation Rate: ASA13B - Patient did NOT require a new airway mgmt  ASA# 10 - Composite Anes Safety: ASA10A - No serious adverse event    Additional Notes:

## 2021-06-16 NOTE — ANESTHESIA CARE TRANSFER NOTE
Patient transferred to [facility].  Taken to PACU on O2 at 10L via mask.  In PACU monitors on, vital signs stable.  SBAR report given to RN on floor per institutional policy and procedure.  Transport called.  Transfer of care.    Last vitals:   Vitals:    03/08/18 1251   BP: 102/63   Pulse: (!) 110   Resp: 14   Temp: 36.1  C (96.9  F)   SpO2: 100%     Patient's level of consciousness is drowsy  Spontaneous respirations: yes  Maintains airway independently: yes  Dentition unchanged: yes  Oropharynx: oropharynx clear of all foreign objects    QCDR Measures:  ASA# 20 - Surgical Safety Checklist: WHO surgical safety checklist completed prior to induction  PQRS# 430 - Adult PONV Prevention: 4558F - Pt received => 2 anti-emetic agents (different classes) preop & intraop  ASA# 8 - Peds PONV Prevention: NA - Not pediatric patient, not GA or 2 or more risk factors NOT present  PQRS# 424 - Maggi-op Temp Management: 4559F - At least one body temp DOCUMENTED => 35.5C or 95.9F within required timeframe  PQRS# 426 - PACU Transfer Protocol: - Transfer of care checklist used  ASA# 14 - Acute Post-op Pain: ASA14B - Patient did NOT experience pain >= 7 out of 10

## 2021-06-16 NOTE — ANESTHESIA PREPROCEDURE EVALUATION
Anesthesia Evaluation      Patient summary reviewed     Airway   Mallampati: II   Pulmonary - normal exam                          Cardiovascular   Rhythm: regular  Rate: normal,         Neuro/Psych      Endo/Other       GI/Hepatic/Renal    (+)   chronic renal disease,      Other findings:                  Uterine abnormality during pregnancy, antepartum     Antepartum anemia in first trimester    History of  delivery     delivery delivered    Pyelonephritis    Ureteral stone with hydronephrosis    Sepsis    Calculus of ureter    Hydronephrosis with urinary obstruction due to ureteral calculus    UTI (urinary tract infection)    Septate uterus             Dental - normal exam                        Anesthesia Plan  Planned anesthetic: general LMA  GEN LMA DEC 10 ZOF 4  SMOKER  ASA 2     Anesthetic plan and risks discussed with: patient  Anesthesia plan special considerations: antiemetics,   Post-op plan: routine recovery

## 2021-06-17 NOTE — PROGRESS NOTES
Assessment/Plan:        Diagnoses and all orders for this visit:    Calculus of ureter  -     Cystoscopy with Stent Removal Education  -     Patient Stated Goal: Prevent further stones    Urinary tract stones  -     Urinalysis Macroscopic  -     Culture, Urine    Calculus of kidney      Stone Management Plan  [facility] [place] [facility] 3/29/2018   Urinary Tract Infection No suspicion of infection   Renal Colic Well controlled symptoms   Renal Failure No suspicion of renal failure   R Stone Event Established event   R Post-op status Stent Removal   L Stone Event No current event             Subjective:      HPI  Ms. Sweta Melchor is a 38 y.o.  female returning to the [facility] [place] for early postoperative follow up for anticipated stent removal.     She returns status post right ureteroscopic extraction for renal and ureteral stone. She has had no unanticipated post-operative events.    She has had no symptoms suspicious for infection and stent was mildly symptomatic with typical issues of flank discomfort and lower urinary tract irritation.     Flexible cystoscopy is performed and indwelling stent is removed without incident.    She will follow up in the office in one month without imaging.       ROS   Review of systems is negative except for HPI.    Past Medical History:   Diagnosis Date     History of transfusion      Hypokalemia      Kidney infection     infrequent now     Kidney stone     Right flank pain     Postpartum hemorrhage      Pyelonephritis      Seasonal allergies      Tobacco use      Uterine abnormality during pregnancy, antepartum     septum in uterus       Past Surgical History:   Procedure Laterality Date     CERVICAL BIOPSY  W/ LOOP ELECTRODE EXCISION        SECTION WITH TUBAL LIGATION   2018      SECTION, LOW TRANSVERSE  10/1997, 2005, 2012     DILATION AND CURETTAGE OF UTERUS  ,,, ,,    had hemorrhage after D&C in  2013     DILATION AND CURETTAGE OF UTERUS N/A 1/3/2018    Procedure: DILATION AND CURETTAGE;  Surgeon: Piper Zheng MD;  Location: [facility];  Service:      [place] CYSTO/URETERO W/LITHOTRIPSY &INDWELL STENT INSRT Right 3/23/2018    Procedure: CYSTOSCOPY, RIGHT URETEROSCOPY, LASER LITHOTRIPSY, STENT REMOVAL, STENT INSERTION;  Surgeon: Lupillo Santana MD;  Location: [facility];  Service: Urology     [place] CYSTOSCOPY,INSERT URETERAL STENT Right 3/8/2018    Procedure: CYSTOSCOPY, WITH URETERAL STENT INSERTION;  Surgeon: Lupillo Santana MD;  Location: [facility];  Service: Urology       Current Outpatient Prescriptions   Medication Sig Dispense Refill     cholecalciferol, vitamin D3, 1,000 unit tablet Take 1,000 Units by mouth daily.       ciprofloxacin HCl (CIPRO) 500 MG tablet Take 1 tablet (500 mg total) by mouth 2 (two) times a day for 14 days. 28 tablet 0     DIPHENHYDRAMINE HCL (BENADRYL ALLERGY ORAL) Take 25 mg by mouth every 6 (six) hours as needed.        HYDROmorphone (DILAUDID) 2 MG tablet Take 1-2 tablets (2-4 mg total) by mouth every 4 (four) hours as needed for pain. 30 tablet 0     loratadine (CLARITIN) 10 mg tablet Take 10 mg by mouth daily.       PRENATAL VIT CALC,IRON,FOLIC (PRENATAL VITAMIN ORAL) Take 1 tablet by mouth daily.        tamsulosin (FLOMAX) 0.4 mg Cp24 Take 1 capsule (0.4 mg total) by mouth daily after supper for 14 days. 14 capsule 0     tolterodine (DETROL) 2 MG tablet Take 1 tablet (2 mg total) by mouth 2 (two) times a day for 14 days. 28 tablet 0     No current facility-administered medications for this visit.        Allergies   Allergen Reactions     Penicillins Swelling and Other (See Comments)     Migraine and gums swelling and throat itching     Codeine Nausea And Vomiting and Swelling     Hydrocodone-Acetaminophen Nausea And Vomiting     Other Environmental Allergy      Oxycodone-Acetaminophen Angiodema and Nausea Only     Sumatriptan Succinate Nausea And  Vomiting       Social History     Social History     Marital status:      Spouse name: N/A     Number of children: N/A     Years of education: N/A     Occupational History     Not on file.     Social History Main Topics     Smoking status: Current Every Day Smoker     Types: Cigarettes     Last attempt to quit: 2017     Smokeless tobacco: Never Used      Comment: 3-4 cigs per day      Alcohol use No     Drug use: No     Sexual activity: Yes     Partners: Male     Birth control/ protection: None     Other Topics Concern     Not on file     Social History Narrative       Family History   Problem Relation Age of Onset     Hearing loss Mother      Hearing loss Father      No Medical Problems Daughter      No Medical Problems Son      Heart disease Maternal Aunt      Cancer Maternal Aunt      Hypertension Maternal Aunt      Heart disease Maternal Uncle      Hypertension Maternal Uncle      Hearing loss Paternal Aunt      Diabetes Paternal Aunt      Cancer Paternal Aunt      Arthritis Maternal Grandmother      Heart disease Maternal Grandmother      Hypertension Maternal Grandmother      Heart disease Maternal Grandfather      Cancer Maternal Grandfather      Diabetes Paternal Grandmother      Objective:      Physical Exam  Vitals:    03/29/18 1051   BP: 116/76   Pulse: 97   Temp: 97.9  F (36.6  C)     General - well developed, well nourished, appropriate for age. Appears no distress at this time  Abdomen - mildly obese soft, non-tender, no hepatosplenomegaly, no masses.   - no flank tenderness, no suprapubic tenderness, kidney and bladder non-palpable  MSK - normal spinal curvature. no spinal tenderness. normal gait. muscular strength intact.  Psych - oriented to time, place, and person, normal mood and affect.      Labs   Urinalysis POC (Office):  Nitrite, UA   Date Value Ref Range Status   03/29/2018 Negative Negative Final   03/07/2018 Negative Negative Final       Lab Urinalysis:  Blood, UA   Date Value Ref  Range Status   03/29/2018 Moderate (!) Negative Final   03/07/2018 Large (!) Negative Final     Nitrite, UA   Date Value Ref Range Status   03/29/2018 Negative Negative Final   03/07/2018 Negative Negative Final     Leukocytes, UA   Date Value Ref Range Status   03/29/2018 Trace (!) Negative Final   03/07/2018 Large (!) Negative Final     pH, UA   Date Value Ref Range Status   03/29/2018 5.5 5.0 - 8.0 Final   03/07/2018 6.0 4.5 - 8.0 Final

## 2021-06-26 ENCOUNTER — HEALTH MAINTENANCE LETTER (OUTPATIENT)
Age: 41
End: 2021-06-26

## 2021-10-16 ENCOUNTER — HEALTH MAINTENANCE LETTER (OUTPATIENT)
Age: 41
End: 2021-10-16

## 2022-07-23 ENCOUNTER — HEALTH MAINTENANCE LETTER (OUTPATIENT)
Age: 42
End: 2022-07-23

## 2022-10-01 ENCOUNTER — HEALTH MAINTENANCE LETTER (OUTPATIENT)
Age: 42
End: 2022-10-01

## 2023-04-15 NOTE — PROGRESS NOTES
Assessment/Plan:        Diagnoses and all orders for this visit:    Calculus of kidney  -     Renal Function Profile; Future; Expected date: 5/7/18  -     Magnesium; Future; Expected date: 5/7/18  -     Uric Acid; Future; Expected date: 5/7/18  -     Parathyroid Hormone Intact with Minerals; Future; Expected date: 5/7/18  -     Calcium, Ionized, Measured; Future; Expected date: 5/7/18  -     Stone Formation, 24 Hour Urine x2; Standing  -     Patient Stated Goal: Prevent further stones  -     24 Hour Urine Collection Steps Education    Urinary tract stones  -     Urinalysis Macroscopic    Urinary calculus      Stone Management Plan  [facility] Stone Management 3/29/2018 5/7/2018   Urinary Tract Infection No suspicion of infection No suspicion of infection   Renal Colic Well controlled symptoms Asymptomatic at this time   Renal Failure No suspicion of renal failure No suspicion of renal failure   R Stone Event Established event Resolved event   Resolved date - 5/7/2018   R Post-op status Stent Removal No imaging   L Stone Event No current event No current event             Subjective:      HPI  Ms. Sweta Melchor is a 38 y.o.  female returning to the [facility] [place] for late postoperative follow-up.     She returns status post Right ureteroscopic extraction for renal and ureteral stone. She has had no unanticipated post-operative events.     She is asymptomatic at present. She denies symptoms of fever, chills, flank pain, nausea, vomiting, urinary frequency and dysuria.    Imaging was not performed today because stone was extracted intact and patient is asymptomatic.     Stone composition was 20 % calcium oxalate and 80 % calcium phosphate (apatite).     She is at risk for ongoing active stone disease and will initiate stone risk evaluation. Serum stone risk chemistries including parathyroid hormone and ionized calcium were drawn today. Two 24 hour urine collections and dietary [facility]       ROS   Review of systems is negative except for HPI.    Past Medical History:   Diagnosis Date     History of transfusion      Hypokalemia      Kidney infection     infrequent now     Kidney stone     Right flank pain     Postpartum hemorrhage      Pyelonephritis      Seasonal allergies      Tobacco use      Uterine abnormality during pregnancy, antepartum     septum in uterus       Past Surgical History:   Procedure Laterality Date     CERVICAL BIOPSY  W/ LOOP ELECTRODE EXCISION        SECTION WITH TUBAL LIGATION   2018      SECTION, LOW TRANSVERSE  10/1997, 2005, 2012     DILATION AND CURETTAGE OF UTERUS  ,,, ,,    had hemorrhage after D&C in      DILATION AND CURETTAGE OF UTERUS N/A 1/3/2018    Procedure: DILATION AND CURETTAGE;  Surgeon: Piper Zheng MD;  Location: [facility];  Service:      [place] CYSTO/URETERO W/LITHOTRIPSY &INDWELL STENT INSRT Right 3/23/2018    Procedure: CYSTOSCOPY, RIGHT URETEROSCOPY, LASER LITHOTRIPSY, STENT REMOVAL, STENT INSERTION;  Surgeon: Lupillo Santana MD;  Location: [facility];  Service: Urology     [place] CYSTOSCOPY,INSERT URETERAL STENT Right 3/8/2018    Procedure: CYSTOSCOPY, WITH URETERAL STENT INSERTION;  Surgeon: Lupillo Santana MD;  Location: [facility];  Service: Urology       Current Outpatient Prescriptions   Medication Sig Dispense Refill     cholecalciferol, vitamin D3, 1,000 unit tablet Take 1,000 Units by mouth daily.       DIPHENHYDRAMINE HCL (BENADRYL ALLERGY ORAL) Take 25 mg by mouth every 6 (six) hours as needed.        loratadine (CLARITIN) 10 mg tablet Take 10 mg by mouth daily.       PRENATAL VIT CALC,IRON,FOLIC (PRENATAL VITAMIN ORAL) Take 1 tablet by mouth daily.        tolterodine (DETROL) 2 MG tablet Take 1 tablet (2 mg total) by mouth 2 (two) times a day for 14 days. 28 tablet 0     No current facility-administered medications for this  visit.        Allergies   Allergen Reactions     Penicillins Swelling and Other (See Comments)     Migraine and gums swelling and throat itching     Codeine Nausea And Vomiting and Swelling     Hydrocodone-Acetaminophen Nausea And Vomiting     Other Environmental Allergy      Oxycodone-Acetaminophen Angiodema and Nausea Only     Sumatriptan Succinate Nausea And Vomiting       Social History     Social History     Marital status:      Spouse name: N/A     Number of children: N/A     Years of education: N/A     Occupational History     Not on file.     Social History Main Topics     Smoking status: Current Every Day Smoker     Types: Cigarettes     Last attempt to quit: 2017     Smokeless tobacco: Never Used      Comment: 3-4 cigs per day      Alcohol use No     Drug use: No     Sexual activity: Yes     Partners: Male     Birth control/ protection: None     Other Topics Concern     Not on file     Social History Narrative       Family History   Problem Relation Age of Onset     Hearing loss Mother      Hearing loss Father      No Medical Problems Daughter      No Medical Problems Son      Heart disease Maternal Aunt      Cancer Maternal Aunt      Hypertension Maternal Aunt      Heart disease Maternal Uncle      Hypertension Maternal Uncle      Hearing loss Paternal Aunt      Diabetes Paternal Aunt      Cancer Paternal Aunt      Arthritis Maternal Grandmother      Heart disease Maternal Grandmother      Hypertension Maternal Grandmother      Heart disease Maternal Grandfather      Cancer Maternal Grandfather      Diabetes Paternal Grandmother      Objective:      Physical Exam  Vitals:    05/07/18 1048   BP: 125/73   Pulse: 81   Temp: 98.1  F (36.7  C)     General - well developed, well nourished, appropriate for age. Appears no distress at this time  Abdomen - mildly obese soft, non-tender, no hepatosplenomegaly, no masses.   - no flank tenderness, no suprapubic tenderness, kidney and bladder  non-palpable  MSK - normal spinal curvature. no spinal tenderness. normal gait. muscular strength intact.  Psych - oriented to time, place, and person, normal mood and affect.      Labs   Urinalysis POC (Office):  Nitrite, UA   Date Value Ref Range Status   05/07/2018 Negative Negative Final   03/29/2018 Negative Negative Final   03/07/2018 Negative Negative Final       Lab Urinalysis:  Blood, UA   Date Value Ref Range Status   05/07/2018 Negative Negative Final   03/29/2018 Moderate (!) Negative Final   03/07/2018 Large (!) Negative Final     Nitrite, UA   Date Value Ref Range Status   05/07/2018 Negative Negative Final   03/29/2018 Negative Negative Final   03/07/2018 Negative Negative Final     Leukocytes, UA   Date Value Ref Range Status   05/07/2018 Trace (!) Negative Final   03/29/2018 Trace (!) Negative Final   03/07/2018 Large (!) Negative Final     pH, UA   Date Value Ref Range Status   05/07/2018 6.0 5.0 - 8.0 Final   03/29/2018 5.5 5.0 - 8.0 Final   03/07/2018 6.0 4.5 - 8.0 Final    and Acute Labs   Urine Culture    Culture   Date Value Ref Range Status   03/29/2018 No Growth  Final   03/08/2018 <10,000 col/ml Escherichia coli (!)  Final   03/07/2018 >100,000 col/ml Escherichia coli (!)  Final           1 = Total assistance

## 2023-08-06 ENCOUNTER — HEALTH MAINTENANCE LETTER (OUTPATIENT)
Age: 43
End: 2023-08-06

## 2024-03-03 ENCOUNTER — HEALTH MAINTENANCE LETTER (OUTPATIENT)
Age: 44
End: 2024-03-03